# Patient Record
Sex: MALE
[De-identification: names, ages, dates, MRNs, and addresses within clinical notes are randomized per-mention and may not be internally consistent; named-entity substitution may affect disease eponyms.]

---

## 2019-10-10 ENCOUNTER — HOSPITAL ENCOUNTER (OUTPATIENT)
Dept: HOSPITAL 5 - XRAY | Age: 64
Discharge: HOME | End: 2019-10-10
Attending: INTERNAL MEDICINE
Payer: MEDICARE

## 2019-10-10 DIAGNOSIS — M54.5: ICD-10-CM

## 2019-10-10 DIAGNOSIS — M19.021: Primary | ICD-10-CM

## 2019-10-10 PROCEDURE — 72110 X-RAY EXAM L-2 SPINE 4/>VWS: CPT

## 2019-10-10 NOTE — XRAY REPORT
RIGHT ELBOW, 3 VIEWS



INDICATION:  ELBOW PAIN. Injury.



COMPARISON: None.



IMPRESSION:  No displaced fracture or malalignment is identified. 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 Small olecranon spur.  There is suggestion of a small anterior and posterior vidhi
int effusion.



Signer Name: Ash Salter Jr, MD 

Signed: 10/10/2019 3:15 PM

 Workstation Name: [id number]

## 2019-10-10 NOTE — XRAY REPORT
LUMBOSACRAL SPINE, 5 VIEWS



INDICATION:  BACK PAIN.  Injury 



COMPARISON: None.



IMPRESSION:  Normal alignment.  No evidence for compression deformity, bone lesion or subluxation.  A
nterior bridging osteophytes are identified throughout the lower thoracic spine and upper lumbar spin
e. The disc spaces appear normal height. Mild diffuse facet arthropathy is evident. The oblique image
s demonstrate wide patency of the neural foramen bilaterally. There are mild symmetric osteoarthritic
 changes at the SI joints.  



Signer Name: Ash Salter Jr, MD 

Signed: 10/10/2019 3:14 PM

 Workstation Name: [id number] Problem: PAIN - ADULT  Goal: Verbalizes/displays adequate comfort level or baseline comfort level  Description  Interventions:  - Encourage patient to monitor pain and request assistance  - Assess pain using appropriate pain scale  - Administer analgesics based on type and severity of pain and evaluate response  - Implement non-pharmacological measures as appropriate and evaluate response  - Consider cultural and social influences on pain and pain management  - Notify physician/advanced practitioner if interventions unsuccessful or patient reports new pain  Outcome: Progressing     Problem: INFECTION - ADULT  Goal: Absence or prevention of progression during hospitalization  Description  INTERVENTIONS:  - Assess and monitor for signs and symptoms of infection  - Monitor lab/diagnostic results  - Monitor all insertion sites, IV  - [place] appropriate cooling/warming therapies per order  - Administer medications as ordered  - Instruct and encourage patient and family to use good hand hygiene technique  - Identify and instruct in appropriate isolation precautions for identified infection/condition   Outcome: Progressing     Problem: SAFETY ADULT  Goal: Patient will remain free of falls  Description  INTERVENTIONS:  - Assess patient frequently for physical needs  -  Identify cognitive and physical deficits and behaviors that affect risk of falls    -  [place] fall precautions as indicated by assessment   - Educate patient/family on patient safety including physical limitations  - Instruct patient to call for assistance with activity based on assessment  - Modify environment to reduce risk of injury  - Consider OT/PT consult to assist with strengthening/mobility  Outcome: Progressing     Problem: DISCHARGE PLANNING  Goal: Discharge to home or other facility with appropriate resources  Description  INTERVENTIONS:  - Identify barriers to discharge w/patient and caregiver  - Arrange for needed discharge resources and transportation as appropriate  - Identify discharge learning needs (meds, wound care, etc )  - Arrange for interpretive services to assist at discharge as needed  - Refer to Case Management Department for coordinating discharge planning if the patient needs post-hospital services based on physician/advanced practitioner order or complex needs related to functional status, cognitive ability, or social support system  Outcome: Progressing     Problem: RESPIRATORY - ADULT  Goal: Achieves optimal ventilation and oxygenation  Description  INTERVENTIONS:  - Assess for changes in respiratory status  - Assess for changes in mentation and behavior  - Position to facilitate oxygenation and minimize respiratory effort  - Oxygen administration by appropriate delivery method based on oxygen saturation (per order) or ABGs  - Initiate smoking cessation education as indicated  - Encourage broncho-pulmonary hygiene including cough, deep breathe, Incentive Spirometry  - Assess the need for suctioning and aspirate as needed  - Assess and instruct to report SOB or any respiratory difficulty  - Respiratory Therapy support as indicated  Outcome: Progressing     Problem: Potential for Falls  Goal: Patient will remain free of falls  Description  INTERVENTIONS:  - Assess patient frequently for physical needs  -  Identify cognitive and physical deficits and behaviors that affect risk of falls    -  [place] fall precautions as indicated by assessment   - Educate patient/family on patient safety including physical limitations  - Instruct patient to call for assistance with activity based on assessment  - Modify environment to reduce risk of injury  - Consider OT/PT consult to assist with strengthening/mobility  Outcome: Progressing